# Patient Record
(demographics unavailable — no encounter records)

---

## 2017-04-15 NOTE — CT
CT HEAD WITHOUT CONTRAST:

4/15/17

 

Multiple axial tomograms are obtained through head without IV enhancement. 

 

HISTORY: 

Fell with injury to head. 

 

Ventricles have normal size and position. No evidence of hemorrhage or mass. Sinuses and mastoids ar
e well aerated. 

 

IMPRESSION:  

No evidence of acute abnormality. 

 

POS: [facility]